# Patient Record
Sex: MALE | URBAN - METROPOLITAN AREA
[De-identification: names, ages, dates, MRNs, and addresses within clinical notes are randomized per-mention and may not be internally consistent; named-entity substitution may affect disease eponyms.]

---

## 2022-12-09 ENCOUNTER — APPOINTMENT (OUTPATIENT)
Dept: URBAN - METROPOLITAN AREA SURGERY 9 | Age: 67
Setting detail: DERMATOLOGY
End: 2022-12-09

## 2022-12-09 DIAGNOSIS — L20.84 INTRINSIC (ALLERGIC) ECZEMA: ICD-10-CM

## 2022-12-09 DIAGNOSIS — B07.8 OTHER VIRAL WARTS: ICD-10-CM

## 2022-12-09 PROBLEM — D48.5 NEOPLASM OF UNCERTAIN BEHAVIOR OF SKIN: Status: ACTIVE | Noted: 2022-12-09

## 2022-12-09 PROCEDURE — OTHER BIOPSY BY SHAVE METHOD: OTHER

## 2022-12-09 PROCEDURE — OTHER PRESCRIPTION: OTHER

## 2022-12-09 PROCEDURE — OTHER COUNSELING: OTHER

## 2022-12-09 PROCEDURE — OTHER OTHER: OTHER

## 2022-12-09 PROCEDURE — OTHER KOH PREP: OTHER

## 2022-12-09 PROCEDURE — 99203 OFFICE O/P NEW LOW 30 MIN: CPT | Mod: 25

## 2022-12-09 PROCEDURE — 11102 TANGNTL BX SKIN SINGLE LES: CPT

## 2022-12-09 PROCEDURE — OTHER MIPS QUALITY: OTHER

## 2022-12-09 RX ORDER — BETAMETHASONE DIPROPIONATE 0.5 MG/G
CREAM, AUGMENTED TOPICAL
Qty: 50 | Refills: 4 | Status: ERX | COMMUNITY
Start: 2022-12-09

## 2022-12-09 ASSESSMENT — LOCATION DETAILED DESCRIPTION DERM
LOCATION DETAILED: LEFT MEDIAL PLANTAR MIDFOOT
LOCATION DETAILED: LEFT DISTAL ULNAR THUMB

## 2022-12-09 ASSESSMENT — LOCATION SIMPLE DESCRIPTION DERM
LOCATION SIMPLE: LEFT THUMB
LOCATION SIMPLE: LEFT PLANTAR SURFACE

## 2022-12-09 ASSESSMENT — LOCATION ZONE DERM
LOCATION ZONE: FEET
LOCATION ZONE: FINGER

## 2022-12-09 NOTE — PROCEDURE: MIPS QUALITY
Quality 110: Preventive Care And Screening: Influenza Immunization: Influenza immunization was not ordered or administered, reason not given
Quality 265: Biopsy Follow-Up: Biopsy results reviewed, communicated, tracked, and documented
Quality 226: Preventive Care And Screening: Tobacco Use: Screening And Cessation Intervention: Patient screened for tobacco use, is a smoker AND did not received Cessation Counseling for Unknown Reasons within the Previous 12 Months
Detail Level: Detailed

## 2022-12-09 NOTE — HPI: RASH
Is This A New Presentation, Or A Follow-Up?: Rash
Additional History: Pt states that it is improving.

## 2022-12-09 NOTE — HPI: EVALUATION OF SKIN LESION(S)
Have Your Spot(S) Been Treated In The Past?: has not been treated
Hpi Title: Evaluation of a Skin Lesion
Additional History: Patient has used about “3 tubes of Compound W and spray.” Patient has also shaved down the area. He states that these methods are not improving the lesion.

## 2022-12-09 NOTE — PROCEDURE: OTHER
Note Text (......Xxx Chief Complaint.): This diagnosis correlates with the
Other (Free Text): After initially shaving lesion for biopsy, deeper pale rubbery nodule noted and further removed and sent for path but deepest remaining portion of lesion heavily cauterized.
Render Risk Assessment In Note?: no
Detail Level: Simple

## 2022-12-09 NOTE — PROCEDURE: KOH PREP
Body Location Override (Optional - Billing Will Still Be Based On Selected Body Map Location If Applicable): Left foot
Detail Level: Detailed
Cpt Desired: 
Showing: no pathologic findings
Koh Intro Text (From The.....): A KOH prep was ordered and evaluated from the
Koh Procedure Text (Tissue Harvesting Technique): A glass slide was used to scrape the skin. The skin scrapings were placed on a glass slide, covered with a coverslip and a KOH solution was applied.

## 2024-07-09 ENCOUNTER — APPOINTMENT (OUTPATIENT)
Dept: URBAN - METROPOLITAN AREA SURGERY 9 | Age: 69
Setting detail: DERMATOLOGY
End: 2024-07-09

## 2024-07-09 DIAGNOSIS — L82.1 OTHER SEBORRHEIC KERATOSIS: ICD-10-CM

## 2024-07-09 DIAGNOSIS — L82.0 INFLAMED SEBORRHEIC KERATOSIS: ICD-10-CM

## 2024-07-09 PROBLEM — D48.5 NEOPLASM OF UNCERTAIN BEHAVIOR OF SKIN: Status: ACTIVE | Noted: 2024-07-09

## 2024-07-09 PROCEDURE — 99212 OFFICE O/P EST SF 10 MIN: CPT | Mod: 25

## 2024-07-09 PROCEDURE — OTHER LIQUID NITROGEN: OTHER

## 2024-07-09 PROCEDURE — OTHER REASSURANCE: OTHER

## 2024-07-09 PROCEDURE — 17110 DESTRUCT B9 LESION 1-14: CPT

## 2024-07-09 PROCEDURE — OTHER OBSERVATION: OTHER

## 2024-07-09 PROCEDURE — OTHER MIPS QUALITY: OTHER

## 2024-07-09 ASSESSMENT — LOCATION SIMPLE DESCRIPTION DERM
LOCATION SIMPLE: RIGHT HAND
LOCATION SIMPLE: CHEST

## 2024-07-09 ASSESSMENT — LOCATION ZONE DERM
LOCATION ZONE: TRUNK
LOCATION ZONE: HAND

## 2024-07-09 ASSESSMENT — LOCATION DETAILED DESCRIPTION DERM
LOCATION DETAILED: STERNUM
LOCATION DETAILED: RIGHT ULNAR DORSAL HAND

## 2024-07-09 NOTE — PROCEDURE: MIPS QUALITY
Quality 226: Preventive Care And Screening: Tobacco Use: Screening And Cessation Intervention: Patient screened for tobacco use, is a smoker AND received Cessation Counseling within measurement period or in the six months prior to the measurement period
Detail Level: Detailed
Quality 47: Advance Care Plan: Advance Care Planning discussed and documented in the medical record; patient did not wish or was not able to name a surrogate decision maker or provide an advance care plan.
Quality 47: Advance Care Plan: Advance Care Planning discussed and documented; advance care plan or surrogate decision maker documented in the medical record.

## 2024-07-09 NOTE — HPI: SKIN LESION
What Type Of Note Output Would You Prefer (Optional)?: Standard Output
Is This A New Presentation, Or A Follow-Up?: Skin Lesion
Additional History: Patient reports that he been applying neosporin on the area and covering it with a bandaid. He states that seem to help. He also reports using betamethasone on the area and that seemed to make it worse \\n

## 2024-08-13 NOTE — PROCEDURE: COUNSELING
98.4
Patient Specific Counseling (Will Not Stick From Patient To Patient): D/C H2O2 and topical antifungal for now. If no better with betamethasone in 1 month, consider Rx antifungal.
Detail Level: Simple

## 2025-07-11 ENCOUNTER — APPOINTMENT (OUTPATIENT)
Dept: URBAN - METROPOLITAN AREA SURGERY 9 | Age: 70
Setting detail: DERMATOLOGY
End: 2025-07-11

## 2025-07-11 DIAGNOSIS — L57.8 OTHER SKIN CHANGES DUE TO CHRONIC EXPOSURE TO NONIONIZING RADIATION: ICD-10-CM

## 2025-07-11 DIAGNOSIS — D22 MELANOCYTIC NEVI: ICD-10-CM

## 2025-07-11 DIAGNOSIS — L30.1 DYSHIDROSIS [POMPHOLYX]: ICD-10-CM

## 2025-07-11 DIAGNOSIS — L82.1 OTHER SEBORRHEIC KERATOSIS: ICD-10-CM

## 2025-07-11 PROBLEM — D22.5 MELANOCYTIC NEVI OF TRUNK: Status: ACTIVE | Noted: 2025-07-11

## 2025-07-11 PROCEDURE — 99213 OFFICE O/P EST LOW 20 MIN: CPT

## 2025-07-11 PROCEDURE — OTHER COUNSELING: OTHER

## 2025-07-11 PROCEDURE — OTHER TREATMENT REGIMEN: OTHER

## 2025-07-11 PROCEDURE — OTHER REASSURANCE: OTHER

## 2025-07-11 PROCEDURE — OTHER MIPS QUALITY: OTHER

## 2025-07-11 ASSESSMENT — LOCATION ZONE DERM
LOCATION ZONE: TRUNK
LOCATION ZONE: ARM
LOCATION ZONE: FEET
LOCATION ZONE: NECK
LOCATION ZONE: FACE

## 2025-07-11 ASSESSMENT — LOCATION SIMPLE DESCRIPTION DERM
LOCATION SIMPLE: LEFT PLANTAR SURFACE
LOCATION SIMPLE: RIGHT ANTERIOR NECK
LOCATION SIMPLE: RIGHT FOREARM
LOCATION SIMPLE: LEFT CHEEK
LOCATION SIMPLE: LOWER BACK
LOCATION SIMPLE: LEFT FOREARM
LOCATION SIMPLE: UPPER BACK

## 2025-07-11 ASSESSMENT — LOCATION DETAILED DESCRIPTION DERM
LOCATION DETAILED: INFERIOR THORACIC SPINE
LOCATION DETAILED: SUPERIOR LUMBAR SPINE
LOCATION DETAILED: RIGHT INFERIOR ANTERIOR NECK
LOCATION DETAILED: RIGHT DISTAL DORSAL FOREARM
LOCATION DETAILED: LEFT PLANTAR FOREFOOT OVERLYING 2ND METATARSAL
LOCATION DETAILED: LEFT INFERIOR CENTRAL MALAR CHEEK
LOCATION DETAILED: LEFT PROXIMAL DORSAL FOREARM